# Patient Record
Sex: FEMALE | Race: WHITE | Employment: OTHER | URBAN - METROPOLITAN AREA
[De-identification: names, ages, dates, MRNs, and addresses within clinical notes are randomized per-mention and may not be internally consistent; named-entity substitution may affect disease eponyms.]

---

## 2019-04-21 ENCOUNTER — HOSPITAL ENCOUNTER (EMERGENCY)
Age: 69
Discharge: HOME | End: 2019-04-21
Attending: EMERGENCY MEDICINE
Payer: MEDICARE

## 2019-04-21 ENCOUNTER — APPOINTMENT (OUTPATIENT)
Dept: ULTRASOUND IMAGING | Age: 69
End: 2019-04-21
Attending: EMERGENCY MEDICINE
Payer: MEDICARE

## 2019-04-21 VITALS
RESPIRATION RATE: 16 BRPM | BODY MASS INDEX: 23.9 KG/M2 | SYSTOLIC BLOOD PRESSURE: 145 MMHG | HEIGHT: 64 IN | OXYGEN SATURATION: 97 % | TEMPERATURE: 98 F | DIASTOLIC BLOOD PRESSURE: 70 MMHG | HEART RATE: 70 BPM | WEIGHT: 140 LBS

## 2019-04-21 DIAGNOSIS — R10.13 EPIGASTRIC PAIN: Primary | ICD-10-CM

## 2019-04-21 PROCEDURE — 84484 ASSAY OF TROPONIN QUANT: CPT | Performed by: EMERGENCY MEDICINE

## 2019-04-21 PROCEDURE — 80053 COMPREHEN METABOLIC PANEL: CPT | Performed by: EMERGENCY MEDICINE

## 2019-04-21 PROCEDURE — 93005 ELECTROCARDIOGRAM TRACING: CPT

## 2019-04-21 PROCEDURE — 85025 COMPLETE CBC W/AUTO DIFF WBC: CPT | Performed by: EMERGENCY MEDICINE

## 2019-04-21 PROCEDURE — 83690 ASSAY OF LIPASE: CPT | Performed by: EMERGENCY MEDICINE

## 2019-04-21 PROCEDURE — 81003 URINALYSIS AUTO W/O SCOPE: CPT | Performed by: EMERGENCY MEDICINE

## 2019-04-21 PROCEDURE — 76700 US EXAM ABDOM COMPLETE: CPT | Performed by: EMERGENCY MEDICINE

## 2019-04-21 PROCEDURE — 93010 ELECTROCARDIOGRAM REPORT: CPT

## 2019-04-21 PROCEDURE — 99285 EMERGENCY DEPT VISIT HI MDM: CPT

## 2019-04-21 PROCEDURE — 36415 COLL VENOUS BLD VENIPUNCTURE: CPT

## 2019-04-21 NOTE — ED INITIAL ASSESSMENT (HPI)
Pt c/o epigastric pain and tingling to her right hand, the pain resolved at this time, but lying flat made it worse at home. No vomiting or diarrhea.

## 2019-04-22 NOTE — ED PROVIDER NOTES
Patient Seen in: THE [facility] Department In [place]    History   Patient presents with:  Abdomen/Flank Pain (GI/)    Stated Complaint: epigastric pain, tingling r hand    HPI    For the past few hours the patient has noted dull pain in the epiga Tender right upper quadrant without guarding or rebound tenderness. No mass or HSM. No hernia. No CVA tenderness. Extremities: No peripheral edema or evidence of DVT. Neuro: Alert and oriented. Speech clear. No facial asymmetry.   Moving all 4 extrem Work-up unremarkable. The patient relates that she had a large bowel movement while in the emergency department and had complete relief of her symptoms. Patient feels entirely well now.   I believe that the tingling in her fingers was probably secondary

## 2021-05-26 PROCEDURE — 88305 TISSUE EXAM BY PATHOLOGIST: CPT | Performed by: INTERNAL MEDICINE

## 2022-12-02 ENCOUNTER — WALK IN (OUTPATIENT)
Dept: URGENT CARE | Age: 72
End: 2022-12-02

## 2022-12-02 VITALS
OXYGEN SATURATION: 98 % | WEIGHT: 128 LBS | RESPIRATION RATE: 16 BRPM | DIASTOLIC BLOOD PRESSURE: 78 MMHG | HEART RATE: 86 BPM | TEMPERATURE: 97.6 F | HEIGHT: 64 IN | SYSTOLIC BLOOD PRESSURE: 118 MMHG | BODY MASS INDEX: 21.85 KG/M2

## 2022-12-02 DIAGNOSIS — R59.1 LYMPHADENOPATHY: Primary | ICD-10-CM

## 2022-12-02 LAB
INTERNAL PROCEDURAL CONTROLS ACCEPTABLE: YES
S PYO AG THROAT QL IA.RAPID: NEGATIVE
TEST LOT EXPIRATION DATE: NORMAL
TEST LOT NUMBER: NORMAL

## 2022-12-02 PROCEDURE — 87880 STREP A ASSAY W/OPTIC: CPT | Performed by: NURSE PRACTITIONER

## 2022-12-02 PROCEDURE — 99203 OFFICE O/P NEW LOW 30 MIN: CPT | Performed by: NURSE PRACTITIONER

## 2022-12-02 ASSESSMENT — PAIN SCALES - GENERAL: PAINLEVEL: 0

## 2022-12-03 ENCOUNTER — TELEPHONE (OUTPATIENT)
Dept: SCHEDULING | Age: 72
End: 2022-12-03

## 2022-12-04 ENCOUNTER — TELEPHONE (OUTPATIENT)
Dept: URGENT CARE | Age: 72
End: 2022-12-04

## (undated) NOTE — ED AVS SNAPSHOT
Tre Funk   MRN: [id number]    Department:  [facility] in [place]   Date of Visit:  4/21/2019           Disclosure     Insurance plans vary and the physician(s) referred by the ER may not be covered by your plan.  Please con tell this physician (or your personal doctor if your instructions are to return to your personal doctor) about any new or lasting problems. The primary care or specialist physician will see patients referred from the [facility] Emergency Department.  Shayan Hollins